# Patient Record
Sex: FEMALE | Race: WHITE
[De-identification: names, ages, dates, MRNs, and addresses within clinical notes are randomized per-mention and may not be internally consistent; named-entity substitution may affect disease eponyms.]

---

## 2017-02-27 ENCOUNTER — HOSPITAL ENCOUNTER (EMERGENCY)
Dept: HOSPITAL 72 - EMR | Age: 57
Discharge: HOME | End: 2017-02-27
Payer: COMMERCIAL

## 2017-02-27 VITALS — BODY MASS INDEX: 20.96 KG/M2 | HEIGHT: 59 IN | WEIGHT: 104 LBS

## 2017-02-27 VITALS — DIASTOLIC BLOOD PRESSURE: 78 MMHG | SYSTOLIC BLOOD PRESSURE: 132 MMHG

## 2017-02-27 VITALS — SYSTOLIC BLOOD PRESSURE: 132 MMHG | DIASTOLIC BLOOD PRESSURE: 78 MMHG

## 2017-02-27 VITALS — DIASTOLIC BLOOD PRESSURE: 82 MMHG | SYSTOLIC BLOOD PRESSURE: 126 MMHG

## 2017-02-27 DIAGNOSIS — R11.2: ICD-10-CM

## 2017-02-27 DIAGNOSIS — M25.552: ICD-10-CM

## 2017-02-27 DIAGNOSIS — Z91.81: ICD-10-CM

## 2017-02-27 DIAGNOSIS — R55: Primary | ICD-10-CM

## 2017-02-27 LAB
ALBUMIN/GLOB SERPL: 1.6 {RATIO} (ref 1–2.7)
ALT SERPL-CCNC: 14 U/L (ref 3–33)
ANION GAP SERPL CALC-SCNC: 13 MMOL/L (ref 5–15)
APTT BLD: 26 SEC (ref 23–33)
AST SERPL-CCNC: 20 U/L (ref 5–40)
BASOPHILS NFR BLD AUTO: 1.1 % (ref 0–2)
CALCIUM SERPL-MCNC: 9.3 MG/DL (ref 8.6–10.2)
CHLORIDE SERPL-SCNC: 102 MEQ/L (ref 98–107)
CO2 SERPL-SCNC: 25 MEQ/L (ref 20–30)
CREAT SERPL-MCNC: 0.9 MG/DL (ref 0.5–0.9)
EOSINOPHIL NFR BLD AUTO: 1.6 % (ref 0–3)
ERYTHROCYTE [DISTWIDTH] IN BLOOD BY AUTOMATED COUNT: 12.4 % (ref 11.6–14.8)
GFR SERPLBLD BASED ON 1.73 SQ M-ARVRAT: > 60 ML/MIN (ref 60–?)
GLOBULIN SER-MCNC: 2.5 G/DL
HEMOLYSIS: 3
INR PPP: 0.9 (ref 0.9–1.1)
LYMPHOCYTES NFR BLD AUTO: 26.7 % (ref 20–45)
MCH RBC QN AUTO: 31.1 PG (ref 27–31)
MCHC RBC AUTO-ENTMCNC: 34 G/DL (ref 32–36)
MCV RBC AUTO: 92 FL (ref 80–99)
MONOCYTES NFR BLD AUTO: 8.4 % (ref 1–10)
NEUTROPHILS NFR BLD AUTO: 62.2 % (ref 45–75)
PLATELET # BLD: 266 K/UL (ref 150–450)
PMV BLD AUTO: 7.3 FL (ref 6.5–10.1)
POTASSIUM SERPL-SCNC: 3.9 MEQ/L (ref 3.4–4.9)
PROT SERPL-MCNC: 6.5 G/DL (ref 6.6–8.7)
PROTHROMBIN TIME: 9.6 SEC (ref 9.3–11.5)
RBC # BLD AUTO: 3.97 M/UL (ref 4.2–5.4)
SODIUM SERPL-SCNC: 140 MEQ/L (ref 135–145)
WBC # BLD AUTO: 7.4 K/UL (ref 4.8–10.8)

## 2017-02-27 PROCEDURE — 73502 X-RAY EXAM HIP UNI 2-3 VIEWS: CPT

## 2017-02-27 PROCEDURE — 85025 COMPLETE CBC W/AUTO DIFF WBC: CPT

## 2017-02-27 PROCEDURE — 85379 FIBRIN DEGRADATION QUANT: CPT

## 2017-02-27 PROCEDURE — 85610 PROTHROMBIN TIME: CPT

## 2017-02-27 PROCEDURE — 85730 THROMBOPLASTIN TIME PARTIAL: CPT

## 2017-02-27 PROCEDURE — 80053 COMPREHEN METABOLIC PANEL: CPT

## 2017-02-27 PROCEDURE — 36415 COLL VENOUS BLD VENIPUNCTURE: CPT

## 2017-02-27 PROCEDURE — 93005 ELECTROCARDIOGRAM TRACING: CPT

## 2017-02-27 PROCEDURE — 99283 EMERGENCY DEPT VISIT LOW MDM: CPT

## 2017-02-27 NOTE — DIAGNOSTIC IMAGING REPORT
Indications: Left hip pain



Technique: Two views left hip



Findings:



Comparison: None.



No fracture, dislocation, lytic destruction, periosteal reaction, surrounding soft

tissue swelling, or other acute changes are demonstrated. No deformity, 

alignment

abnormality, arthritic change, soft tissue calcification, or other chronic changes

are demonstrated.



IMPRESSION:



Negative left hip series.



This correlates with [facility] preliminary report.

## 2017-02-27 NOTE — EMERGENCY ROOM REPORT
History of Present Illness


General


Chief Complaint:  Multiple Trauma/Fall


Source:  Patient





Present Illness


HPI


Patient is a 56-year-old female who presented after a syncopal episode.  

Patient recently been prescribed Chantix.  She reported feeling nauseated and 

subsequently vomiting.The patient reported feeling lightheaded prior to passing 

out.  She reports having subsequent pain to her left hip.  She had been able to 

anbulate with a walker after injury. She denied other locations of pain.


Allergies:  


Coded Allergies:  


     No Known Allergies (Unverified , 6/5/13)





Patient History


Past Medical History:  see triage record


Reviewed Nursing Documentation:  PMH: Agreed, PSxH: Agreed





Nursing Documentation-PMH


Past Medical History:  No Stated History





Review of Systems


All Other Systems:  negative except mentioned in HPI





Physical Exam





Vital Signs








  Date Time  Temp Pulse Resp B/P Pulse Ox O2 Delivery O2 Flow Rate FiO2


 


2/27/17 00:53 97.9 84 16 129/82 100 Room Air  








Sp02 EP Interpretation:  reviewed, normal


General Appearance:  normal inspection, well appearing, no apparent distress, 

alert, GCS 15


Head:  atraumatic


ENT:  normal ENT inspection, hearing grossly normal, normal voice


Neck:  normal inspection, full range of motion, supple, no bony tend


Respiratory:  normal inspection, lungs clear, normal breath sounds, no 

respiratory distress, no retraction, no wheezing


Cardiovascular #1:  regular rate, rhythm, no edema


Gastrointestinal:  normal inspection, normal bowel sounds, non tender, soft, no 

guarding, no hernia


Genitourinary:  no CVA tenderness


Musculoskeletal:  normal inspection, back normal, normal range of motion


Neurologic:  normal inspection, alert, oriented x3, responsive, CNs III-XII nml 

as tested, speech normal


Psychiatric:  normal inspection, judgement/insight normal, mood/affect normal


Skin:  normal inspection, normal color, no rash





Medical Decision Making


Diagnostic Impression:  


 Primary Impression:  


 Fall


 Additional Impressions:  


 Syncope


 Vasovagal episode


ER Course


The patient presented for syncope.lDifferential diagnosis included but not 

limited to syncope versus seizure.  Potential causes for syncope included 

arrhythmia, dehydration, acute coronary syndrome, severe anemia, pulmonary 

embolus.


Because of complexity of patient's case laboratory testing and imaging studies 

were ordered.


The x-ray of the left hip read by radiologist showed normal


Without any fracture.  EKG and rhythm me showed normal sinus rhythm with a rate 

in the 70s without PVCs or ectopy.  The patient was noted to have some pain to 

the medial thigh which is consistent with a muscle strain.  The patient is 

advised to follow up with  primary care doctor in 1-2 days.  Patient is advised 

to return if any worsening condition or if any changes in status that are 

concerning.





Laboratory Tests








Test


  2/27/17


01:14


 


White Blood Count


  7.4 K/UL


(4.8-10.8)


 


Red Blood Count


  3.97 M/UL


(4.20-5.40)  L


 


Hemoglobin


  12.4 G/DL


(12.0-16.0)


 


Hematocrit


  36.4 %


(37.0-47.0)  L


 


Mean Corpuscular Volume 92 FL (80-99)  


 


Mean Corpuscular Hemoglobin


  31.1 PG


(27.0-31.0)  H


 


Mean Corpuscular Hemoglobin


Concent 34.0 G/DL


(32.0-36.0)


 


Red Cell Distribution Width


  12.4 %


(11.6-14.8)


 


Platelet Count


  266 K/UL


(150-450)


 


Mean Platelet Volume


  7.3 FL


(6.5-10.1)


 


Neutrophils (%) (Auto)


  62.2 %


(45.0-75.0)


 


Lymphocytes (%) (Auto)


  26.7 %


(20.0-45.0)


 


Monocytes (%) (Auto)


  8.4 %


(1.0-10.0)


 


Eosinophils (%) (Auto)


  1.6 %


(0.0-3.0)


 


Basophils (%) (Auto)


  1.1 %


(0.0-2.0)


 


Prothrombin Time Pending  


 


Prothrombin Time INR Pending  


 


PTT Pending  


 


D-Dimer


  889 ng/mL


(<500)  H


 


Sodium Level


  140 mEQ/L


(135-145)


 


Potassium Level


  3.9 mEQ/L


(3.4-4.9)


 


Chloride Level


  102 mEQ/L


()


 


Carbon Dioxide Level


  25 mEQ/L


(20-30)


 


Anion Gap 13 (5-15)  


 


Blood Urea Nitrogen


  19 mg/dL


(7-23)


 


Creatinine


  0.9 mg/dL


(0.5-0.9)


 


Estimate Glomerular


Filtration Rate > 60 mL/min


(>60)


 


Glucose Level


  108 mg/dL


()  H


 


Calcium Level


  9.3 mg/dL


(8.6-10.2)


 


Total Bilirubin


  < 0.2 mg/dL


(0.0-1.2)


 


Aspartate Amino Transferase


(AST) 20 U/L (5-40)  


 


 


Alanine Aminotransferase (ALT) 14 U/L (3-33)  


 


Alkaline Phosphatase


  54 U/L


()


 


Total Protein


  6.5 g/dL


(6.6-8.7)  L


 


Albumin


  4.0 g/dL


(3.5-5.2)


 


Globulin 2.5 g/dL  


 


Albumin/Globulin Ratio 1.6 (1.0-2.7)  











Last Vital Signs








  Date Time  Temp Pulse Resp B/P Pulse Ox O2 Delivery O2 Flow Rate FiO2


 


2/27/17 01:18 97.9 84 16 126/82 100 Room Air  








Status:  improved


Disposition:  HOME, SELF-CARE


Condition:  Stable


Referrals:  


NON PHYSICIAN (PCP)











Mike Ruth Feb 27, 2017 01:25

## 2017-02-28 NOTE — CARDIOLOGY REPORT
--------------- APPROVED REPORT --------------





EKG Measurement

Heart [id number]

MT [id number]

[id number]

[id number]

[id number]





Normal sinus rhythm

Normal ECG

## 2017-08-01 ENCOUNTER — HOSPITAL ENCOUNTER (EMERGENCY)
Dept: HOSPITAL 72 - EMR | Age: 57
Discharge: HOME | End: 2017-08-01
Payer: COMMERCIAL

## 2017-08-01 VITALS — HEIGHT: 59 IN | WEIGHT: 97 LBS | BODY MASS INDEX: 19.56 KG/M2

## 2017-08-01 VITALS — DIASTOLIC BLOOD PRESSURE: 84 MMHG | SYSTOLIC BLOOD PRESSURE: 129 MMHG

## 2017-08-01 VITALS — SYSTOLIC BLOOD PRESSURE: 129 MMHG | DIASTOLIC BLOOD PRESSURE: 84 MMHG

## 2017-08-01 DIAGNOSIS — D25.1: ICD-10-CM

## 2017-08-01 DIAGNOSIS — D25.2: ICD-10-CM

## 2017-08-01 DIAGNOSIS — R10.2: Primary | ICD-10-CM

## 2017-08-01 DIAGNOSIS — N39.0: ICD-10-CM

## 2017-08-01 LAB
ALBUMIN/GLOB SERPL: 1.5 {RATIO} (ref 1–2.7)
ALT SERPL-CCNC: 11 U/L (ref 3–33)
ANION GAP SERPL CALC-SCNC: 12 MMOL/L (ref 5–15)
APPEARANCE UR: (no result)
AST SERPL-CCNC: 22 U/L (ref 5–40)
BACTERIA #/AREA URNS HPF: (no result) /HPF
BASOPHILS NFR BLD AUTO: 2 % (ref 0–2)
CALCIUM SERPL-MCNC: 9.7 MG/DL (ref 8.6–10.2)
CHLORIDE SERPL-SCNC: 105 MEQ/L (ref 98–107)
CO2 SERPL-SCNC: 28 MEQ/L (ref 20–30)
CREAT SERPL-MCNC: 1 MG/DL (ref 0.5–0.9)
EOSINOPHIL NFR BLD AUTO: 0.5 % (ref 0–3)
ERYTHROCYTE [DISTWIDTH] IN BLOOD BY AUTOMATED COUNT: 12.4 % (ref 11.6–14.8)
GFR SERPLBLD BASED ON 1.73 SQ M-ARVRAT: 56.9 ML/MIN (ref 60–?)
GLOBULIN SER-MCNC: 2.7 G/DL
HEMOLYSIS: 6
KETONES UR QL STRIP: (no result)
LEUKOCYTE ESTERASE UR QL STRIP: (no result)
LIPASE SERPL-CCNC: 70 U/L (ref ?–60)
LYMPHOCYTES NFR BLD AUTO: 35.2 % (ref 20–45)
MCH RBC QN AUTO: 33 PG (ref 27–31)
MCHC RBC AUTO-ENTMCNC: 34.7 G/DL (ref 32–36)
MCV RBC AUTO: 95 FL (ref 80–99)
MONOCYTES NFR BLD AUTO: 7.6 % (ref 1–10)
NEUTROPHILS NFR BLD AUTO: 54.8 % (ref 45–75)
NITRITE UR QL STRIP: NEGATIVE
PH UR STRIP: 6 [PH] (ref 4.5–8)
PLATELET # BLD: 307 K/UL (ref 150–450)
PMV BLD AUTO: 6.7 FL (ref 6.5–10.1)
POTASSIUM SERPL-SCNC: 4.1 MEQ/L (ref 3.4–4.9)
PROT SERPL-MCNC: 7 G/DL (ref 6.6–8.7)
PROT UR QL STRIP: NEGATIVE
RBC # BLD AUTO: 4.37 M/UL (ref 4.2–5.4)
RBC #/AREA URNS HPF: (no result) /HPF (ref 0–2)
SODIUM SERPL-SCNC: 145 MEQ/L (ref 135–145)
SP GR UR STRIP: 1.02 (ref 1–1.03)
SQUAMOUS #/AREA URNS LPF: (no result) /LPF
UROBILINOGEN UR-MCNC: 1 MG/DL (ref 0–1)
WBC # BLD AUTO: 6.9 K/UL (ref 4.8–10.8)
WBC #/AREA URNS HPF: (no result) /HPF (ref 0–2)
YEAST #/AREA URNS HPF: (no result) /HPF

## 2017-08-01 PROCEDURE — 80053 COMPREHEN METABOLIC PANEL: CPT

## 2017-08-01 PROCEDURE — 85025 COMPLETE CBC W/AUTO DIFF WBC: CPT

## 2017-08-01 PROCEDURE — 96374 THER/PROPH/DIAG INJ IV PUSH: CPT

## 2017-08-01 PROCEDURE — 36415 COLL VENOUS BLD VENIPUNCTURE: CPT

## 2017-08-01 PROCEDURE — 99284 EMERGENCY DEPT VISIT MOD MDM: CPT

## 2017-08-01 PROCEDURE — 83690 ASSAY OF LIPASE: CPT

## 2017-08-01 PROCEDURE — 76856 US EXAM PELVIC COMPLETE: CPT

## 2017-08-01 PROCEDURE — 81003 URINALYSIS AUTO W/O SCOPE: CPT

## 2017-08-01 PROCEDURE — 87086 URINE CULTURE/COLONY COUNT: CPT

## 2017-08-01 PROCEDURE — 87181 SC STD AGAR DILUTION PER AGT: CPT

## 2017-08-01 NOTE — EMERGENCY ROOM REPORT
History of Present Illness


General


Chief Complaint:  Female Urogenital Problems


Source:  Patient





Present Illness


HPI


Patient presents emergency department today complaining of pelvic pain.  

Patient states that she was recently treated for a yeast infection but symptoms 

have not improved instead she's had worsening pelvic pain.  She denies any 

fever nausea vomiting diarrhea chills.  No other complaints are noted.  

Symptoms noted to be moderate to severe.No other modifying factors.  No other 

associated signs and symptoms.  No other complaints were noted.


Allergies:  


Coded Allergies:  


     No Known Allergies (Unverified , 6/5/13)





Patient History


Past Medical History:  none


Past Surgical History:  none


Pertinent Family History:  none


Social History:  Denies: alcohol use, drug use, smoking


Reviewed Nursing Documentation:  PMH: Agreed, PSxH: Agreed





Nursing Documentation-PMH


Hx Gastrointestinal Problems:  Yes - Gall stones





Review of Systems


All Other Systems:  negative except mentioned in HPI





Physical Exam





Vital Signs








  Date Time  Temp Pulse Resp B/P Pulse Ox O2 Delivery O2 Flow Rate FiO2


 


8/1/17 17:15 99.1 77 16 132/87 99 Room Air  








Sp02 EP Interpretation:  reviewed, normal


General Appearance:  normal inspection, well appearing, no apparent distress, 

alert


Head:  atraumatic


Eyes:  bilateral eye normal inspection


ENT:  normal ENT inspection, hearing grossly normal, normal voice


Neck:  normal inspection, full range of motion, supple, no bony tend


Respiratory:  normal inspection, lungs clear, normal breath sounds, no 

respiratory distress, no retraction, no wheezing


Cardiovascular #1:  regular rate, rhythm, no edema


Gastrointestinal:  normal inspection, normal bowel sounds, soft, no guarding, 

no hernia, tenderness - suprapubic


Genitourinary:  no CVA tenderness


Musculoskeletal:  normal inspection, back normal, normal range of motion


Neurologic:  normal inspection, alert, responsive, speech normal


Psychiatric:  normal inspection, judgement/insight normal, mood/affect normal


Skin:  normal inspection, normal color, no rash





Medical Decision Making


Diagnostic Impression:  


 Primary Impression:  


 Pelvic pain


 Additional Impression:  


 UTI (urinary tract infection)


ER Course


Patient presents emergency department today complaining of pelvic pain.  

Differential diagnoses include UTI, ovarian torsion, use infection, colitis 

just to name a few.Given the severity of the patient's presentation I felt this 

is a highly complex patient.  This patient required extensive workup.  Patient'

s laboratory workup showed an elevated WBC consistent with UTI.  Patient's 

pelvic ultrasound was negative.  Given patient's presentation I felt the 

patient would benefit from antibiotics.  Patient was given prescription for 

antibiotics recommend close followup.Patient is advised to follow up with 

primary doctor in 2-3 days and return the emergency room for any worsening 

symptoms and as needed.





Labs








Test


  8/1/17


17:23 8/1/17


19:14


 


Urine Color Yellow  


 


Urine Appearance


  Slightly


cloudy 


 


 


Urine pH 6 (4.5-8.0)  


 


Urine Specific Gravity


  1.020


(1.005-1.035) 


 


 


Urine Protein


  Negative


(NEGATIVE) 


 


 


Urine Glucose (UA)


  Negative


(NEGATIVE) 


 


 


Urine Ketones 1+ (NEGATIVE)  


 


Urine Occult Blood 1+ (NEGATIVE)  


 


Urine Nitrite


  Negative


(NEGATIVE) 


 


 


Urine Bilirubin


  Negative


(NEGATIVE) 


 


 


Urine Urobilinogen


  1 MG/DL


(0.0-1.0) 


 


 


Urine Leukocyte Esterase 3+ (NEGATIVE)  


 


Urine RBC


  2-4 /HPF (0 -


2) 


 


 


Urine WBC


  5-10 /HPF (0 -


2) 


 


 


Urine Squamous Epithelial


Cells Few /LPF


(NONE/OCC) 


 


 


Urine Bacteria


  Moderate /HPF


(NONE) 


 


 


Urine Yeast


  Few /HPF


(NONE) 


 


 


White Blood Count


  


  6.9 K/UL


(4.8-10.8)


 


Red Blood Count


  


  4.37 M/UL


(4.20-5.40)


 


Hemoglobin


  


  14.4 G/DL


(12.0-16.0)


 


Hematocrit


  


  41.5 %


(37.0-47.0)


 


Mean Corpuscular Volume  95 FL (80-99) 


 


Mean Corpuscular Hemoglobin


  


  33.0 PG


(27.0-31.0)


 


Mean Corpuscular Hemoglobin


Concent 


  34.7 G/DL


(32.0-36.0)


 


Red Cell Distribution Width


  


  12.4 %


(11.6-14.8)


 


Platelet Count


  


  307 K/UL


(150-450)


 


Mean Platelet Volume


  


  6.7 FL


(6.5-10.1)


 


Neutrophils (%) (Auto)


  


  54.8 %


(45.0-75.0)


 


Lymphocytes (%) (Auto)


  


  35.2 %


(20.0-45.0)


 


Monocytes (%) (Auto)


  


  7.6 %


(1.0-10.0)


 


Eosinophils (%) (Auto)


  


  0.5 %


(0.0-3.0)


 


Basophils (%) (Auto)


  


  2.0 %


(0.0-2.0)


 


Sodium Level


  


  145 mEQ/L


(135-145)


 


Potassium Level


  


  4.1 mEQ/L


(3.4-4.9)


 


Chloride Level


  


  105 mEQ/L


()


 


Carbon Dioxide Level


  


  28 mEQ/L


(20-30)


 


Anion Gap  12 (5-15) 


 


Blood Urea Nitrogen


  


  16 mg/dL


(7-23)


 


Creatinine


  


  1.0 mg/dL


(0.5-0.9)


 


Estimat Glomerular Filtration


Rate 


  56.9 mL/min


(>60)


 


Glucose Level


  


  112 mg/dL


()


 


Calcium Level


  


  9.7 mg/dL


(8.6-10.2)


 


Total Bilirubin


  


  < 0.2 mg/dL


(0.0-1.2)


 


Aspartate Amino Transf


(AST/SGOT) 


  22 U/L (5-40) 


 


 


Alanine Aminotransferase


(ALT/SGPT) 


  11 U/L (3-33) 


 


 


Alkaline Phosphatase


  


  72 U/L


()


 


Total Protein


  


  7.0 g/dL


(6.6-8.7)


 


Albumin


  


  4.3 g/dL


(3.5-5.2)


 


Globulin  2.7 g/dL 


 


Albumin/Globulin Ratio  1.5 (1.0-2.7) 


 


Lipase  70 U/L (< 60) 











Last Vital Signs








  Date Time  Temp Pulse Resp B/P Pulse Ox O2 Delivery O2 Flow Rate FiO2


 


8/1/17 19:16 98.9 79 15 129/84 99 Room Air  








Status:  improved


Disposition:  HOME, SELF-CARE


Condition:  Stable


Scripts


Fluconazole (FLUCONAZOLE) 100 Mg Tablet


100 MG ORAL DAILY, #7 TAB 0 Refills


   Prov: PRASHANT SEALS M.D.         8/1/17 


Cephalexin* (KEFLEX*) 500 Mg Capsule


500 MG ORAL Q6H, #28 CAP 0 Refills


   Prov: PRASHANT SEALS M.D.         8/1/17


Patient Instructions:  Urinary Tract Infection











PRASHANT SEALS M.D. Aug 1, 2017 20:54

## 2017-08-02 NOTE — DIAGNOSTIC IMAGING REPORT
Indication: PAIN lower abdominal pain, postmenopausal, possible urinary tract

infection



Technique: Transabdominal and transvaginal images



Comparison: None



Findings: Uterus measures 6 cm length by 2 cm AP. Demonstrates a questionable 

upper

fundal exophytic fibroid, as well as multiple intramural and subserosal 

fibroids.

Myometrium demonstrates somewhat increased vascularity. The endometrium measures 2

mm thick. A small calcification is seen in the cervix, nonspecific. Left ovary

measures 8 mm in length. Right ovary could not be visualized. No free cul-de-sac

fluid.



Impression: Fibroid uterus



Nonspecific increased final vascularity, significance uncertain



No definite adnexal mass. Note, however, inability to visualize the right ovary

## 2019-09-11 ENCOUNTER — HOSPITAL ENCOUNTER (EMERGENCY)
Dept: HOSPITAL 72 - EMR | Age: 59
Discharge: HOME | End: 2019-09-11
Payer: COMMERCIAL

## 2019-09-11 VITALS — HEIGHT: 59 IN | WEIGHT: 102 LBS | BODY MASS INDEX: 20.56 KG/M2

## 2019-09-11 VITALS — DIASTOLIC BLOOD PRESSURE: 72 MMHG | SYSTOLIC BLOOD PRESSURE: 125 MMHG

## 2019-09-11 VITALS — SYSTOLIC BLOOD PRESSURE: 120 MMHG | DIASTOLIC BLOOD PRESSURE: 71 MMHG

## 2019-09-11 VITALS — SYSTOLIC BLOOD PRESSURE: 118 MMHG | DIASTOLIC BLOOD PRESSURE: 72 MMHG

## 2019-09-11 DIAGNOSIS — R10.13: Primary | ICD-10-CM

## 2019-09-11 DIAGNOSIS — M54.6: ICD-10-CM

## 2019-09-11 LAB
ADD MANUAL DIFF: NO
ALBUMIN SERPL-MCNC: 3.6 G/DL (ref 3.4–5)
ALBUMIN/GLOB SERPL: 1 {RATIO} (ref 1–2.7)
ALP SERPL-CCNC: 76 U/L (ref 46–116)
ALT SERPL-CCNC: 21 U/L (ref 12–78)
ANION GAP SERPL CALC-SCNC: 10 MMOL/L (ref 5–15)
APPEARANCE UR: CLEAR
APTT BLD: 26 SEC (ref 23–33)
APTT PPP: (no result) S
AST SERPL-CCNC: 24 U/L (ref 15–37)
BASOPHILS NFR BLD AUTO: 1.5 % (ref 0–2)
BILIRUB SERPL-MCNC: 0.2 MG/DL (ref 0.2–1)
BUN SERPL-MCNC: 16 MG/DL (ref 7–18)
CALCIUM SERPL-MCNC: 8.9 MG/DL (ref 8.5–10.1)
CHLORIDE SERPL-SCNC: 105 MMOL/L (ref 98–107)
CO2 SERPL-SCNC: 25 MMOL/L (ref 21–32)
CREAT SERPL-MCNC: 1.1 MG/DL (ref 0.55–1.3)
EOSINOPHIL NFR BLD AUTO: 0.9 % (ref 0–3)
ERYTHROCYTE [DISTWIDTH] IN BLOOD BY AUTOMATED COUNT: 11.3 % (ref 11.6–14.8)
GLOBULIN SER-MCNC: 3.5 G/DL
GLUCOSE UR STRIP-MCNC: NEGATIVE MG/DL
HCT VFR BLD CALC: 35.1 % (ref 37–47)
HGB BLD-MCNC: 12.5 G/DL (ref 12–16)
INR PPP: 1 (ref 0.9–1.1)
KETONES UR QL STRIP: NEGATIVE
LEUKOCYTE ESTERASE UR QL STRIP: (no result)
LYMPHOCYTES NFR BLD AUTO: 29.4 % (ref 20–45)
MCV RBC AUTO: 87 FL (ref 80–99)
MONOCYTES NFR BLD AUTO: 8.2 % (ref 1–10)
NEUTROPHILS NFR BLD AUTO: 60 % (ref 45–75)
NITRITE UR QL STRIP: NEGATIVE
PH UR STRIP: 7 [PH] (ref 4.5–8)
PLATELET # BLD: 335 K/UL (ref 150–450)
POTASSIUM SERPL-SCNC: 3.9 MMOL/L (ref 3.5–5.1)
PROT UR QL STRIP: NEGATIVE
RBC # BLD AUTO: 4.05 M/UL (ref 4.2–5.4)
SODIUM SERPL-SCNC: 140 MMOL/L (ref 136–145)
SP GR UR STRIP: 1 (ref 1–1.03)
UROBILINOGEN UR-MCNC: NORMAL MG/DL (ref 0–1)
WBC # BLD AUTO: 7.4 K/UL (ref 4.8–10.8)

## 2019-09-11 PROCEDURE — 93005 ELECTROCARDIOGRAM TRACING: CPT

## 2019-09-11 PROCEDURE — 96374 THER/PROPH/DIAG INJ IV PUSH: CPT

## 2019-09-11 PROCEDURE — 85610 PROTHROMBIN TIME: CPT

## 2019-09-11 PROCEDURE — 74018 RADEX ABDOMEN 1 VIEW: CPT

## 2019-09-11 PROCEDURE — 85025 COMPLETE CBC W/AUTO DIFF WBC: CPT

## 2019-09-11 PROCEDURE — 99284 EMERGENCY DEPT VISIT MOD MDM: CPT

## 2019-09-11 PROCEDURE — 85730 THROMBOPLASTIN TIME PARTIAL: CPT

## 2019-09-11 PROCEDURE — 36415 COLL VENOUS BLD VENIPUNCTURE: CPT

## 2019-09-11 PROCEDURE — 84484 ASSAY OF TROPONIN QUANT: CPT

## 2019-09-11 PROCEDURE — 83690 ASSAY OF LIPASE: CPT

## 2019-09-11 PROCEDURE — 71045 X-RAY EXAM CHEST 1 VIEW: CPT

## 2019-09-11 PROCEDURE — 81003 URINALYSIS AUTO W/O SCOPE: CPT

## 2019-09-11 PROCEDURE — 80053 COMPREHEN METABOLIC PANEL: CPT

## 2019-09-11 NOTE — NUR
ED Nurse Note:



Received report from Kylie CHERRY. Patient alert and oriented x4, verbally 
responsive. C/o 6/10 pain on right upper abdominal. With IV line on Right Ac 
20g patent and intact. Breathing even nad unlabored.

## 2019-09-11 NOTE — EMERGENCY ROOM REPORT
History of Present Illness


General


Chief Complaint:  Abdominal Pain


Source:  Patient





Present Illness


HPI


Patient presents with 2 days of epigastric pain this radiates towards her back.

  She is been taking Nexium for 2 months.  7 years ago she had a history of H. 

pylori.  The pain is mild if she does not press on that area.  Today she got 

concerned because when she tried to swallow food it had difficulty going down 

into her stomach.  She felt somewhat short of breath at that time.  She denies 

chest pain.  There is no vomiting or diarrhea.  No melena.  No fevers or 

chills.  She feels bloated.  She felt anxious.





No sore throat, palpitations, dysuria, joint pain, rashes, visual changes, 

headache.


Allergies:  


Coded Allergies:  


     No Known Allergies (Unverified , 6/5/13)





Patient History


Past Medical History:  see triage record


Social History:  Denies: smoking, alcohol use, drug use


Social History Narrative


 with daughter


Last Menstrual Period:  n/a


Reviewed Nursing Documentation:  PMH: Agreed; PSxH: Agreed





Nursing Documentation-PMH


Past Medical History:  No History, Except For


Hx Gastrointestinal Problems:  Yes - Gall stones





Review of Systems


All Other Systems:  negative except mentioned in HPI





Physical Exam





Vital Signs








  Date Time  Temp Pulse Resp B/P (MAP) Pulse Ox O2 Delivery O2 Flow Rate FiO2


 


9/11/19 16:47 98.4 81 18 113/80 (91) 98 Room Air  








Sp02 EP Interpretation:  reviewed, normal


General Appearance:  well appearing, no apparent distress, GCS 15


Head:  normocephalic


Eyes:  bilateral eye normal inspection, bilateral eye PERRL


ENT:  moist mucus membranes


Neck:  supple


Respiratory:  lungs clear, normal breath sounds


Cardiovascular #1:  regular rate, rhythm, no edema


Cardiovascular #2:  2+ radial (R), 2+ femoral (R), 2+ femoral (L)


Gastrointestinal:  normal inspection, normal bowel sounds, no mass, no bruit, 

non-distended, tenderness - Minimal and pressing epigastric area and xiphoid.  

No right upper quadrant tenderness


Genitourinary:  no CVA tenderness


Musculoskeletal:  back normal - Though reported mid back tenderness, gait/

station normal, normal range of motion, no calf tenderness, Jagdeep's Sign 

negative


Neurologic:  alert, oriented x3, grossly normal


Psychiatric:  anxious


Skin:  no rash





Medical Decision Making


Diagnostic Impression:  


 Primary Impression:  


 Abdominal pain


 Qualified Codes:  R10.13 - Epigastric pain


 Additional Impression:  


 Back pain


 Qualified Codes:  M54.6 - Pain in thoracic spine


ER Course


Patient presents with epigastric pain rating towards her back.  Differential 

includes pancreatitis, cholecystitis, cholelithiasis, GERD, gastritis, chest 

wall pain, aortic aneurysm, back pain amongst others.  Patient will be 

evaluated with EKG, chest x-ray abdomen film and labs.  This is not a surgical 

belly.  Based on the abdominal exam and history aneurysm is excluded.  Patient 

will be treated with some mild IV hydration and also given a dose of Pepcid.  

CT of the abdomen is not indicated unless leukocytosis or other lab 

abnormalities.





EKG with normal sinus rhythm rate of 75 and normal.  Chest x-ray unremarkable.  

Abdominal film as below no obstruction or free air.  Labs basically 

unremarkable.





Patient still with discomfort.  She refuses stronger analgesia at this time.  

She is concerned about the bloating and the pain.  I discussed possible 

etiologies in the she would need to follow-up with her own doctor.  She refused 

a prescription for tramadol but agreed for Mylanta.





No medical emergency at this time.  Patient stable for outpatient observation 

and treatment.  She was advised to return if she is worsening.





Laboratory Tests








Test


  9/11/19


17:22


 


White Blood Count


  7.4 K/UL


(4.8-10.8)


 


Red Blood Count


  4.05 M/UL


(4.20-5.40)  L


 


Hemoglobin


  12.5 G/DL


(12.0-16.0)


 


Hematocrit


  35.1 %


(37.0-47.0)  L


 


Mean Corpuscular Volume 87 FL (80-99)  


 


Mean Corpuscular Hemoglobin


  30.8 PG


(27.0-31.0)


 


Mean Corpuscular Hemoglobin


Concent 35.5 G/DL


(32.0-36.0)


 


Red Cell Distribution Width


  11.3 %


(11.6-14.8)  L


 


Platelet Count


  335 K/UL


(150-450)


 


Mean Platelet Volume


  5.6 FL


(6.5-10.1)  L


 


Neutrophils (%) (Auto)


  60.0 %


(45.0-75.0)


 


Lymphocytes (%) (Auto)


  29.4 %


(20.0-45.0)


 


Monocytes (%) (Auto)


  8.2 %


(1.0-10.0)


 


Eosinophils (%) (Auto)


  0.9 %


(0.0-3.0)


 


Basophils (%) (Auto)


  1.5 %


(0.0-2.0)


 


Prothrombin Time


  10.2 SEC


(9.30-11.50)


 


Prothrombin Time INR 1.0 (0.9-1.1)  


 


PTT


  26 SEC (23-33)


 


 


Urine Color Pale yellow  


 


Urine Appearance Clear  


 


Urine pH 7 (4.5-8.0)  


 


Urine Specific Gravity


  1.005


(1.005-1.035)


 


Urine Protein


  Negative


(NEGATIVE)


 


Urine Glucose (UA)


  Negative


(NEGATIVE)


 


Urine Ketones


  Negative


(NEGATIVE)


 


Urine Blood


  Negative


(NEGATIVE)


 


Urine Nitrite


  Negative


(NEGATIVE)


 


Urine Bilirubin


  Negative


(NEGATIVE)


 


Urine Urobilinogen


  Normal MG/DL


(0.0-1.0)


 


Urine Leukocyte Esterase


  2+ (NEGATIVE)


H


 


Urine RBC


  0-2 /HPF (0 -


2)


 


Urine WBC


  2-4 /HPF (0 -


2)


 


Urine Squamous Epithelial


Cells Few /LPF


(NONE/OCC)


 


Urine Bacteria


  Few /HPF


(NONE)


 


Sodium Level


  140 MMOL/L


(136-145)


 


Potassium Level


  3.9 MMOL/L


(3.5-5.1)


 


Chloride Level


  105 MMOL/L


()


 


Carbon Dioxide Level


  25 MMOL/L


(21-32)


 


Anion Gap


  10 mmol/L


(5-15)


 


Blood Urea Nitrogen


  16 mg/dL


(7-18)


 


Creatinine


  1.1 MG/DL


(0.55-1.30)


 


Estimate Glomerular


Filtration Rate 50.7 mL/min


(>60)


 


Glucose Level


  95 MG/DL


()


 


Calcium Level


  8.9 MG/DL


(8.5-10.1)


 


Total Bilirubin


  0.2 MG/DL


(0.2-1.0)


 


Aspartate Amino Transferase


(AST) 24 U/L (15-37)


 


 


Alanine Aminotransferase (ALT)


  21 U/L (12-78)


 


 


Alkaline Phosphatase


  76 U/L


()


 


Troponin I


  0.000 ng/mL


(0.000-0.056)


 


Total Protein


  7.1 G/DL


(6.4-8.2)


 


Albumin


  3.6 G/DL


(3.4-5.0)


 


Globulin 3.5 g/dL  


 


Albumin/Globulin Ratio 1.0 (1.0-2.7)  


 


Lipase


  309 U/L


()








EKG Diagnostic Results


Rate:  normal


Rhythm:  NSR


ST Segments:  no acute changes





Rhythm Strip Diag. Results


EP Interpretation:  yes


Rhythm:  NSR, no PVC's, no ectopy





Chest X-Ray Diagnostic Results


Chest X-Ray Diagnostic Results :  


   Chest X-Ray Ordered:  Yes


   # of Views/Limited/Complete:  1 View


   Indication:  Other


   EP Interpretation:  Yes


   Interpretation:  no consolidation, no effusion, no pneumothorax


   Impression:  No acute disease


   Electronically Signed by:  Electronically signed by Preet Stafford MD





Other X-Ray Diagnostic Results


Other X-Ray Diagnostic Results :  


   X-Ray ordered:  Abdomen


   # of Views/Limited Vs Complete:  1 View


   Indication:  Pain


   EP Interpretation:  Yes


   Interpretation:  nonspecific bowel gas, no sbo, other - zipper


   Impression:  No acute disease


   Electronically Signed by:  Electronically signed by Preet Stafford MD





Last Vital Signs








  Date Time  Temp Pulse Resp B/P (MAP) Pulse Ox O2 Delivery O2 Flow Rate FiO2


 


9/11/19 19:52 97.8 80 18 125/72 98 Room Air  








Status:  improved


Disposition:  HOME, SELF-CARE


Condition:  Improved


Scripts


Mag Hydrox/Al Hydrox/Simeth (MAALOX MAXIMUM STRENGTH SUSP) 355 Ml Oral.susp


30 ML PO Q6HR, #240 ML


   Prov: Preet Stafford MD         9/11/19 


Acetaminophen (Tylenol) 325 Mg Tablet


650 MG ORAL Q6H PRN for Prn Pain/Headache/Temp > 101, #20 TAB 0 Refills


   Prov: Preet Stafford MD         9/11/19











Preet Stafford MD Sep 11, 2019 17:13

## 2019-09-11 NOTE — NUR
ED Nurse Note:

Patient walked into ED c/o right upper abdominal pain radiating to her back 
since yesterday. patient denies vomiting or diarrhea. patient reports she feels 
bloated. 

patient is alert awake x4 ambulatory steady gait. breathing unlabored and even.

## 2019-09-12 ENCOUNTER — HOSPITAL ENCOUNTER (EMERGENCY)
Dept: HOSPITAL 72 - EMR | Age: 59
Discharge: HOME | End: 2019-09-12
Payer: COMMERCIAL

## 2019-09-12 VITALS — HEIGHT: 59 IN | WEIGHT: 102 LBS | BODY MASS INDEX: 20.56 KG/M2

## 2019-09-12 VITALS — SYSTOLIC BLOOD PRESSURE: 116 MMHG | DIASTOLIC BLOOD PRESSURE: 72 MMHG

## 2019-09-12 VITALS — SYSTOLIC BLOOD PRESSURE: 112 MMHG | DIASTOLIC BLOOD PRESSURE: 74 MMHG

## 2019-09-12 DIAGNOSIS — K80.20: ICD-10-CM

## 2019-09-12 DIAGNOSIS — K20.9: ICD-10-CM

## 2019-09-12 DIAGNOSIS — K29.70: Primary | ICD-10-CM

## 2019-09-12 LAB
ADD MANUAL DIFF: NO
ALBUMIN SERPL-MCNC: 3.9 G/DL (ref 3.4–5)
ALBUMIN/GLOB SERPL: 1.1 {RATIO} (ref 1–2.7)
ALP SERPL-CCNC: 80 U/L (ref 46–116)
ALT SERPL-CCNC: 20 U/L (ref 12–78)
ANION GAP SERPL CALC-SCNC: 7 MMOL/L (ref 5–15)
APPEARANCE UR: CLEAR
APTT PPP: (no result) S
AST SERPL-CCNC: 26 U/L (ref 15–37)
BASOPHILS NFR BLD AUTO: 1.4 % (ref 0–2)
BILIRUB SERPL-MCNC: 0.2 MG/DL (ref 0.2–1)
BUN SERPL-MCNC: 14 MG/DL (ref 7–18)
CALCIUM SERPL-MCNC: 9.3 MG/DL (ref 8.5–10.1)
CHLORIDE SERPL-SCNC: 106 MMOL/L (ref 98–107)
CO2 SERPL-SCNC: 29 MMOL/L (ref 21–32)
CREAT SERPL-MCNC: 0.9 MG/DL (ref 0.55–1.3)
EOSINOPHIL NFR BLD AUTO: 0.8 % (ref 0–3)
ERYTHROCYTE [DISTWIDTH] IN BLOOD BY AUTOMATED COUNT: 11.8 % (ref 11.6–14.8)
GLOBULIN SER-MCNC: 3.7 G/DL
GLUCOSE UR STRIP-MCNC: NEGATIVE MG/DL
HCT VFR BLD CALC: 40 % (ref 37–47)
HGB BLD-MCNC: 13.5 G/DL (ref 12–16)
KETONES UR QL STRIP: NEGATIVE
LEUKOCYTE ESTERASE UR QL STRIP: (no result)
LYMPHOCYTES NFR BLD AUTO: 28.6 % (ref 20–45)
MCV RBC AUTO: 92 FL (ref 80–99)
MONOCYTES NFR BLD AUTO: 6.7 % (ref 1–10)
NEUTROPHILS NFR BLD AUTO: 62.5 % (ref 45–75)
NITRITE UR QL STRIP: NEGATIVE
PH UR STRIP: 7 [PH] (ref 4.5–8)
PLATELET # BLD: 353 K/UL (ref 150–450)
POTASSIUM SERPL-SCNC: 4 MMOL/L (ref 3.5–5.1)
PROT UR QL STRIP: NEGATIVE
RBC # BLD AUTO: 4.36 M/UL (ref 4.2–5.4)
SODIUM SERPL-SCNC: 142 MMOL/L (ref 136–145)
SP GR UR STRIP: 1.01 (ref 1–1.03)
UROBILINOGEN UR-MCNC: NORMAL MG/DL (ref 0–1)
WBC # BLD AUTO: 6.5 K/UL (ref 4.8–10.8)

## 2019-09-12 PROCEDURE — 76700 US EXAM ABDOM COMPLETE: CPT

## 2019-09-12 PROCEDURE — 99284 EMERGENCY DEPT VISIT MOD MDM: CPT

## 2019-09-12 PROCEDURE — 80053 COMPREHEN METABOLIC PANEL: CPT

## 2019-09-12 PROCEDURE — 96361 HYDRATE IV INFUSION ADD-ON: CPT

## 2019-09-12 PROCEDURE — 85025 COMPLETE CBC W/AUTO DIFF WBC: CPT

## 2019-09-12 PROCEDURE — 74177 CT ABD & PELVIS W/CONTRAST: CPT

## 2019-09-12 PROCEDURE — 36415 COLL VENOUS BLD VENIPUNCTURE: CPT

## 2019-09-12 PROCEDURE — 96374 THER/PROPH/DIAG INJ IV PUSH: CPT

## 2019-09-12 PROCEDURE — 81003 URINALYSIS AUTO W/O SCOPE: CPT

## 2019-09-12 PROCEDURE — 83690 ASSAY OF LIPASE: CPT

## 2019-09-12 NOTE — EMERGENCY ROOM REPORT
History of Present Illness


General


Chief Complaint:  Pain


Source:  Patient





Present Illness


HPI


This patient states that for the past 4-5 days she has had pain in her 

epigastrium.  The pain is very localized under her xiphoid process.  She states 

she is also had several episodes of nausea and vomiting.  She is also had a sour

/metallic taste in the back of her throat.  She states that the area is tender 

to palpation.  She also has pain in her upper back.  She denies fever or 

chills.  She does have a history of gallstones.  She has not had her 

gallbladder removed.  She denies diarrhea or constipation.  She denies headache 

or neck pain.  She is accompanied by her friend who is also concerned because 

when she was having severe pain she "passed out."  She denies shortness of 

breath or chest pain.  She has no other complaints.


Allergies:  


Coded Allergies:  


     No Known Allergies (Unverified , 6/5/13)





Patient History


Past Medical History:  other - Gallstones


Social History:  Denies: smoking, alcohol use, drug use


Reviewed Nursing Documentation:  PMH: Agreed; PSxH: Agreed





Nursing Documentation-PM


Past Medical History:  No History, Except For


Hx Gastrointestinal Problems:  Yes - Gall stones





Review of Systems


All Other Systems:  negative except mentioned in HPI





Physical Exam





Vital Signs








  Date Time  Temp Pulse Resp B/P (MAP) Pulse Ox O2 Delivery O2 Flow Rate FiO2


 


9/12/19 14:08 98.4 74 20 109/77 (88) 99 Room Air  








Sp02 EP Interpretation:  reviewed, normal


General Appearance:  no apparent distress, alert, GCS 15, non-toxic


Head:  normocephalic, atraumatic


Eyes:  bilateral eye normal inspection, bilateral eye PERRL


ENT:  hearing grossly normal, normal pharynx, no angioedema, normal voice


Neck:  full range of motion, supple/symm/no masses


Respiratory:  chest non-tender, lungs clear, normal breath sounds, no 

respiratory distress, no retraction, no accessory muscle use, speaking full 

sentences


Cardiovascular #1:  regular rate, rhythm, no edema


Gastrointestinal:  normal bowel sounds, soft, non-distended, no guarding, no 

rebound, tenderness - TTP over the xiphoid process and a very localized area of 

her epigastrium.


Rectal:  deferred


Musculoskeletal:  back normal, gait/station normal, normal range of motion, non-

tender


Neurologic:  alert, oriented x3, responsive, motor strength/tone normal, 

sensory intact, speech normal


Psychiatric:  judgement/insight normal, memory normal, mood/affect normal, no 

suicidal/homicidal ideation


Skin:  no rash, normal color





Medical Decision Making


Diagnostic Impression:  


 Primary Impression:  


 Gastritis


 Additional Impressions:  


 Esophagitis


 Cholelithiasis


ER Course


This patient has a clinical presentation consistent with gastritis and 

esophagitis.  The location of the pain and history and physical examination is 

consistent with this.  I considered other concerning differentials, to include 

appendicitis, cholecystitis, pancreatitis, perforated viscus, aortic aneurysm, 

and pyelonephritis to name a few.  However, laboratory workup in combination 

with medical and surgical history and physical exam makes these unlikely at 

this time.  The patient does have cholelithiasis.  She states that she was 

diagnosed with this 8 years ago.  Right upper quadrant ultrasound does show 

gallstones but no evidence of cholecystitis.  I did offer the patient admission 

to the hospital for symptomatic cholelithiasis and possible cholecystectomy.  

However, the patient states she would rather treat for gastritis and 

esophagitis at this time.  She states that she will follow-up as instructed 

with a gastroenterologist for upper endoscopy.  She was educated to return to 

the emergency department for symptoms worsen for concern of worsening 

gallbladder disease it.  I did educate the patient on close return precautions 

and followup instructions.





Laboratory Tests








Test


  9/12/19


14:40


 


White Blood Count


  6.5 K/UL


(4.8-10.8)


 


Red Blood Count


  4.36 M/UL


(4.20-5.40)


 


Hemoglobin


  13.5 G/DL


(12.0-16.0)


 


Hematocrit


  40.0 %


(37.0-47.0)


 


Mean Corpuscular Volume 92 FL (80-99)  


 


Mean Corpuscular Hemoglobin


  30.9 PG


(27.0-31.0)


 


Mean Corpuscular Hemoglobin


Concent 33.7 G/DL


(32.0-36.0)


 


Red Cell Distribution Width


  11.8 %


(11.6-14.8)


 


Platelet Count


  353 K/UL


(150-450)


 


Mean Platelet Volume


  7.1 FL


(6.5-10.1)


 


Neutrophils (%) (Auto)


  62.5 %


(45.0-75.0)


 


Lymphocytes (%) (Auto)


  28.6 %


(20.0-45.0)


 


Monocytes (%) (Auto)


  6.7 %


(1.0-10.0)


 


Eosinophils (%) (Auto)


  0.8 %


(0.0-3.0)


 


Basophils (%) (Auto)


  1.4 %


(0.0-2.0)


 


Urine Color Pale yellow  


 


Urine Appearance Clear  


 


Urine pH 7 (4.5-8.0)  


 


Urine Specific Gravity


  1.010


(1.005-1.035)


 


Urine Protein


  Negative


(NEGATIVE)


 


Urine Glucose (UA)


  Negative


(NEGATIVE)


 


Urine Ketones


  Negative


(NEGATIVE)


 


Urine Blood


  Negative


(NEGATIVE)


 


Urine Nitrite


  Negative


(NEGATIVE)


 


Urine Bilirubin


  Negative


(NEGATIVE)


 


Urine Urobilinogen


  Normal MG/DL


(0.0-1.0)


 


Urine Leukocyte Esterase


  1+ (NEGATIVE)


H


 


Urine RBC


  0-2 /HPF (0 -


2)


 


Urine WBC


  2-4 /HPF (0 -


2)


 


Urine Squamous Epithelial


Cells Few /LPF


(NONE/OCC)


 


Urine Bacteria


  Few /HPF


(NONE)


 


Sodium Level


  142 MMOL/L


(136-145)


 


Potassium Level


  4.0 MMOL/L


(3.5-5.1)


 


Chloride Level


  106 MMOL/L


()


 


Carbon Dioxide Level


  29 MMOL/L


(21-32)


 


Anion Gap


  7 mmol/L


(5-15)


 


Blood Urea Nitrogen


  14 mg/dL


(7-18)


 


Creatinine


  0.9 MG/DL


(0.55-1.30)


 


Estimate Glomerular


Filtration Rate > 60 mL/min


(>60)


 


Glucose Level


  124 MG/DL


()  H


 


Calcium Level


  9.3 MG/DL


(8.5-10.1)


 


Total Bilirubin


  0.2 MG/DL


(0.2-1.0)


 


Aspartate Amino Transferase


(AST) 26 U/L (15-37)


 


 


Alanine Aminotransferase (ALT)


  20 U/L (12-78)


 


 


Alkaline Phosphatase


  80 U/L


()


 


Total Protein


  7.6 G/DL


(6.4-8.2)


 


Albumin


  3.9 G/DL


(3.4-5.0)


 


Globulin 3.7 g/dL  


 


Albumin/Globulin Ratio 1.1 (1.0-2.7)  


 


Lipase


  270 U/L


()








CT/MRI/US Diagnostic Results


CT/MRI/US Diagnostic Results :  


   Imaging Test Ordered:  CT abd/pelvis, RUQ US


   Impression


No acute findings.  Mild diverticulosis of the colon.  See official report in 

electronic medical record.





RUQ US: Multiple gallstones in the GB.  Minimal wall thickening.  See official 

report in electronic medical record.





Last Vital Signs








  Date Time  Temp Pulse Resp B/P (MAP) Pulse Ox O2 Delivery O2 Flow Rate FiO2


 


9/12/19 14:22 98.2 72 18 112/74 100 Room Air  








Status:  improved


Disposition:  HOME, SELF-CARE


Condition:  Improved


Scripts


Simethicone (Simethicone) 125 Mg Capsule


125 MG PO TID, #60 CAP


   Prov: Nae Hansen DO         9/12/19 


Esomeprazole Magnesium (NEXIUM) 40 Mg Capsule.dr


40 MG ORAL TWICE A DAY for 8 Days, #16 CAP


   Prov: Nae Hansen DO         9/12/19











Nae Hansen DO Sep 12, 2019 15:43

## 2019-09-12 NOTE — NUR
ED Nurse Note:



PT WALKED IN TO ER TODAY FROM HOME. AOX4. PT SEEN YESTERDAY AT [facility] ER FOR UPPER 
ABDOMINAL PAIN RADIATING TO BACK. PT RETURNS TODAY WITH THE SAME COMPLAINT AND 
IS REQUESTING A CT SCAN.

## 2019-09-12 NOTE — DIAGNOSTIC IMAGING REPORT
Indication: Abdominal pain

 

Technique: Continuous helical transaxial imaging of the abdomen and pelvis was

obtained from the lung bases to the pubic symphysis during intravenous contrast

administration. Coronal 2-D reformats were also obtained. Study obtained in a Siemens

sensation 64 slice CT.  Automatic Exposure Control was utilized.

 

Total Dose length Product (DLP):  417.33 mGycm

 

CT Dose Index Volume (CTDIvol):   9.2 mGy

 

Comparison: None

 

Findings: There is mild posterior basal atelectasis demonstrated. Liver and spleen

are unremarkable. Gallstones are suspected. There is no adrenal mass. Kidneys enhance

normally. There is no hydronephrosis. Few diverticula noted in the colon. There is no

free fluid. Atrophic uterus is noted. There is no evidence of appendicitis. Pars

interarticularis defects demonstrated bilaterally at L5. There is minimal anterior

subluxation of the L5 vertebra relative to S1.

 

IMPRESSION:

 

No acute findings demonstrated.

 

Mild diverticulosis of the colon. No definite diverticulitis.

 

L5 spondylolysis. Grade 1 spondylolisthesis L5 on S1.

 

 

 

The CT scanner at [facility] is accredited by the American College of

Radiology and the scans are performed using dose optimization techniques as

appropriate to a performed exam including Automatic Exposure control.

## 2019-09-12 NOTE — DIAGNOSTIC IMAGING REPORT
Indication: Abdominal pain

 

Comparison:  None

 

Single view of the abdomen obtained 

 

Findings:

   

Bowel gas pattern is nonspecific. Some stool noted in the colon. No mass, ectopic

calcifications, or abnormal gas collections are identified. The bones are osteopenic.

 

Impression: No acute findings

## 2019-09-13 NOTE — DIAGNOSTIC IMAGING REPORT
Indication: Abdominal pain

 

Technique: Gray-scale and duplex images of the upper abdomen were obtained

 

Comparison: No prior sonograms. Reference made to abdomen pelvis CT 9/12/2019

 

Findings: Gallbladder demonstrated gallstones. The gallbladder wall is borderline

thickened, measuring 4 mm in thickness. Gallbladder is incompletely distended,

however. No pericholecystic fluid. Sonographic Stevenson's sign is negative.  Common

bile duct measures 3 mm in diameter.  No intrahepatic biliary ductal dilatation. 

Liver demonstrates normal echogenicity, no focal abnormality.   Portal vein and

hepatic veins are patent.   Pancreas is unremarkable.    Spleen is unremarkable. 

Left kidney measures 10.3 cm in length.  Right kidney measures 11 cm length.  Both

kidneys demonstrate normal echogenicity.  There is no hydronephrosis.   No focal

abnormality . Non-aneurysmal abdominal aorta .

 

Impression: Cholelithiasis. Mild gallbladder wall thickening raises the possibility

of acute cholecystitis. Correlate with clinical findings, consider hepatobiliary

nuclear scan if there is high clinical suspicion

 

Negative for dilated bile ducts

 

No acute or significant abnormality otherwise

 

This agrees with the preliminary interpretation provided overnight by Dr. Loera

## 2019-09-16 ENCOUNTER — HOSPITAL ENCOUNTER (EMERGENCY)
Dept: HOSPITAL 72 - EMR | Age: 59
Discharge: HOME | End: 2019-09-16
Payer: COMMERCIAL

## 2019-09-16 VITALS — SYSTOLIC BLOOD PRESSURE: 146 MMHG | DIASTOLIC BLOOD PRESSURE: 92 MMHG

## 2019-09-16 VITALS — DIASTOLIC BLOOD PRESSURE: 86 MMHG | SYSTOLIC BLOOD PRESSURE: 138 MMHG

## 2019-09-16 VITALS — HEIGHT: 59 IN | WEIGHT: 102 LBS | BODY MASS INDEX: 20.56 KG/M2

## 2019-09-16 VITALS — SYSTOLIC BLOOD PRESSURE: 138 MMHG | DIASTOLIC BLOOD PRESSURE: 86 MMHG

## 2019-09-16 DIAGNOSIS — F41.9: ICD-10-CM

## 2019-09-16 DIAGNOSIS — R06.00: ICD-10-CM

## 2019-09-16 DIAGNOSIS — R10.9: Primary | ICD-10-CM

## 2019-09-16 DIAGNOSIS — R42: ICD-10-CM

## 2019-09-16 LAB
ADD MANUAL DIFF: NO
ALBUMIN SERPL-MCNC: 4.1 G/DL (ref 3.4–5)
ALBUMIN/GLOB SERPL: 1.2 {RATIO} (ref 1–2.7)
ALP SERPL-CCNC: 81 U/L (ref 46–116)
ALT SERPL-CCNC: 19 U/L (ref 12–78)
ANION GAP SERPL CALC-SCNC: 10 MMOL/L (ref 5–15)
APTT BLD: 26 SEC (ref 23–33)
AST SERPL-CCNC: 21 U/L (ref 15–37)
BASOPHILS NFR BLD AUTO: 1.5 % (ref 0–2)
BILIRUB SERPL-MCNC: 0.3 MG/DL (ref 0.2–1)
BUN SERPL-MCNC: 14 MG/DL (ref 7–18)
CALCIUM SERPL-MCNC: 9.4 MG/DL (ref 8.5–10.1)
CHLORIDE SERPL-SCNC: 104 MMOL/L (ref 98–107)
CO2 SERPL-SCNC: 26 MMOL/L (ref 21–32)
CREAT SERPL-MCNC: 1 MG/DL (ref 0.55–1.3)
EOSINOPHIL NFR BLD AUTO: 0.7 % (ref 0–3)
ERYTHROCYTE [DISTWIDTH] IN BLOOD BY AUTOMATED COUNT: 11 % (ref 11.6–14.8)
GLOBULIN SER-MCNC: 3.5 G/DL
HCT VFR BLD CALC: 40.8 % (ref 37–47)
HGB BLD-MCNC: 13.8 G/DL (ref 12–16)
INR PPP: 1 (ref 0.9–1.1)
LYMPHOCYTES NFR BLD AUTO: 37.8 % (ref 20–45)
MCV RBC AUTO: 91 FL (ref 80–99)
MONOCYTES NFR BLD AUTO: 8.9 % (ref 1–10)
NEUTROPHILS NFR BLD AUTO: 51.1 % (ref 45–75)
PLATELET # BLD: 335 K/UL (ref 150–450)
POTASSIUM SERPL-SCNC: 4.1 MMOL/L (ref 3.5–5.1)
RBC # BLD AUTO: 4.46 M/UL (ref 4.2–5.4)
SODIUM SERPL-SCNC: 140 MMOL/L (ref 136–145)
WBC # BLD AUTO: 7.3 K/UL (ref 4.8–10.8)

## 2019-09-16 PROCEDURE — 99284 EMERGENCY DEPT VISIT MOD MDM: CPT

## 2019-09-16 PROCEDURE — 71045 X-RAY EXAM CHEST 1 VIEW: CPT

## 2019-09-16 PROCEDURE — 85025 COMPLETE CBC W/AUTO DIFF WBC: CPT

## 2019-09-16 PROCEDURE — 76700 US EXAM ABDOM COMPLETE: CPT

## 2019-09-16 PROCEDURE — 93005 ELECTROCARDIOGRAM TRACING: CPT

## 2019-09-16 PROCEDURE — 80053 COMPREHEN METABOLIC PANEL: CPT

## 2019-09-16 PROCEDURE — 85730 THROMBOPLASTIN TIME PARTIAL: CPT

## 2019-09-16 PROCEDURE — 85610 PROTHROMBIN TIME: CPT

## 2019-09-16 PROCEDURE — 96360 HYDRATION IV INFUSION INIT: CPT

## 2019-09-16 PROCEDURE — 70450 CT HEAD/BRAIN W/O DYE: CPT

## 2019-09-16 PROCEDURE — 36415 COLL VENOUS BLD VENIPUNCTURE: CPT

## 2019-09-16 NOTE — NUR
ED Nurse Note:



DAUGHTER ADDS THAT FATHER HAD A FALL YESTERDAY. PT DENIES HEAD TRAUMA OR LOC. 
PT DENIES HEAD PAIN.

## 2019-09-16 NOTE — NUR
ED Nurse Note:



PT WALKED IN TO ER TODAY FROM HOME. AOX4. PT C/O DIFFICULTY SPEAKING AND 
TINGLING SENSATION OF LEFT SIDE OF BODY AND FACE X AROUND 1300 TODAY. NIHSS 
SCORE: 0. PT DENIES CHEST PAIN OR SOB.

## 2019-09-16 NOTE — EMERGENCY ROOM REPORT
History of Present Illness


General


Chief Complaint:  Stroke Symptoms


Source:  Patient





Present Illness


HPI


Patient is a 59-year-old female who presented after increased numbness to her 

arms and lips.  Patient had recently been seen in the hospital after having 

increased abdominal discomfort.  She had been prescribed acid blockers.  She 

reports having increased numbness to her arm approximately 1 to 2 hours ago.  

She denies any severe headache.  She had recently been prescribed medications 

for symptom medic treatment of abdominal discomfort.  She denies any chest pain 

at this time.  She denies being diabetic.  She speaks primarily Vincentian. She 

reports having some numbness to both arms.  She denies any difficulty with 

strength.  She had recently been having increased abdominal discomfort.  This 

is described as a burning sensation.


Allergies:  


Coded Allergies:  


     No Known Allergies (Unverified , 6/5/13)





Patient History


Past Medical History:  see triage record


Last Menstrual Period:  na


Reviewed Nursing Documentation:  PMH: Agreed; PSxH: Agreed





Nursing Documentation-PMH


Past Medical History:  No History, Except For


Hx Gastrointestinal Problems:  Yes - Gall stones





Review of Systems


All Other Systems:  negative except mentioned in HPI





Physical Exam





Vital Signs








  Date Time  Temp Pulse Resp B/P (MAP) Pulse Ox O2 Delivery O2 Flow Rate FiO2


 


9/16/19 16:33 98.2 97 20 141/98 (112) 99 Room Air  








Sp02 EP Interpretation:  reviewed, normal


General Appearance:  normal inspection, well appearing, no apparent distress, 

alert, GCS 15


Head:  atraumatic


ENT:  normal ENT inspection, hearing grossly normal, normal voice


Neck:  normal inspection, full range of motion, supple, no bony tend


Respiratory:  normal inspection, lungs clear, normal breath sounds, no 

respiratory distress, no retraction, no wheezing


Cardiovascular #1:  regular rate, rhythm, no edema


Gastrointestinal:  normal inspection, normal bowel sounds, non tender, soft, no 

guarding, no hernia


Genitourinary:  no CVA tenderness


Musculoskeletal:  normal inspection, back normal, normal range of motion


Neurologic:  normal inspection, alert, oriented x3, responsive, CNs III-XII nml 

as tested, motor strength/tone normal, DTRs symmetric, speech normal, no 

pronator


Psychiatric:  normal inspection, judgement/insight normal, mood/affect normal


Skin:  no rash





Medical Decision Making


Diagnostic Impression:  


 Primary Impression:  


 Abdominal pain


 Additional Impression:  


 Anxiety


ER Course


Patient presented for abdominal pain. Differential diagnoses included ischemic 

bowel, appendicitis, perforated viscus, abdominal aortic aneurysm, inferior 

myocardial infarction, viral gastroenteritis among others.Because patient's 

complexity imaging studies,  and laboratory testing ordered.


Laboratory testing showed .  Electrolytes were unremarkable.


Lipase was was normal


White blood count was was normal.





EKG interpreted by me showed normal sinus rhythm without acute ST or T wave 

changes. CT of the abdomen pelvis had been performed on prior visit.  


Abdominal ultrasound showed cholelithiasis without evident cholecystitis. 


Patient was noted to have no focal neurologic abnormalities evident on exam.  

She was offered admission for further work-up which she declined.  Patient does 

not appear to have any evidence of acute CVA on CT imaging.  Patient was 

advised to discontinue taking aluminum containing antacids.


Patient states that she will follow-up with her doctor.


Patient advised to return if she had any worsening condition or other concerns.





Labs








Test


  9/16/19


16:55


 


White Blood Count


  7.3 K/UL


(4.8-10.8)


 


Red Blood Count


  4.46 M/UL


(4.20-5.40)


 


Hemoglobin


  13.8 G/DL


(12.0-16.0)


 


Hematocrit


  40.8 %


(37.0-47.0)


 


Mean Corpuscular Volume 91 FL (80-99) 


 


Mean Corpuscular Hemoglobin


  31.0 PG


(27.0-31.0)


 


Mean Corpuscular Hemoglobin


Concent 34.0 G/DL


(32.0-36.0)


 


Red Cell Distribution Width


  11.0 %


(11.6-14.8)


 


Platelet Count


  335 K/UL


(150-450)


 


Mean Platelet Volume


  6.4 FL


(6.5-10.1)


 


Neutrophils (%) (Auto)


  51.1 %


(45.0-75.0)


 


Lymphocytes (%) (Auto)


  37.8 %


(20.0-45.0)


 


Monocytes (%) (Auto)


  8.9 %


(1.0-10.0)


 


Eosinophils (%) (Auto)


  0.7 %


(0.0-3.0)


 


Basophils (%) (Auto)


  1.5 %


(0.0-2.0)


 


Prothrombin Time


  10.2 SEC


(9.30-11.50)


 


Prothromb Time International


Ratio 1.0 (0.9-1.1) 


 


 


Activated Partial


Thromboplast Time 26 SEC (23-33) 


 


 


Sodium Level


  140 MMOL/L


(136-145)


 


Potassium Level


  4.1 MMOL/L


(3.5-5.1)


 


Chloride Level


  104 MMOL/L


()


 


Carbon Dioxide Level


  26 MMOL/L


(21-32)


 


Anion Gap


  10 mmol/L


(5-15)


 


Blood Urea Nitrogen


  14 mg/dL


(7-18)


 


Creatinine


  1.0 MG/DL


(0.55-1.30)


 


Estimat Glomerular Filtration


Rate 56.5 mL/min


(>60)


 


Glucose Level


  99 MG/DL


()


 


Calcium Level


  9.4 MG/DL


(8.5-10.1)


 


Total Bilirubin


  0.3 MG/DL


(0.2-1.0)


 


Aspartate Amino Transf


(AST/SGOT) 21 U/L (15-37) 


 


 


Alanine Aminotransferase


(ALT/SGPT) 19 U/L (12-78) 


 


 


Alkaline Phosphatase


  81 U/L


()


 


Total Protein


  7.6 G/DL


(6.4-8.2)


 


Albumin


  4.1 G/DL


(3.4-5.0)


 


Globulin 3.5 g/dL 


 


Albumin/Globulin Ratio 1.2 (1.0-2.7) 








EKG Diagnostic Results


Rate:  normal


Rhythm:  NSR


ST Segments:  no acute changes





Last Vital Signs








  Date Time  Temp Pulse Resp B/P (MAP) Pulse Ox O2 Delivery O2 Flow Rate FiO2


 


9/16/19 16:33 98.2 97 20 141/98 (112) 99 Room Air  








Status:  improved


Disposition:  HOME, SELF-CARE


Condition:  Stable


Scripts


Omeprazole (OMEPRAZOLE) 20 Mg Capsule.


20 MG ORAL DAILY, #30 CAP


   Prov: Mike Ruth MD         9/16/19











Mike Ruth MD Sep 16, 2019 16:51

## 2019-09-16 NOTE — DIAGNOSTIC IMAGING REPORT
Indication: Dizziness

 

Technique: Contiguous 5 mm thick transaxial imaging of the head obtained in a Siemens

Sensation 64 slice CT scanner.  Soft tissue and bone windows generated.  Automatic

Exposure Control was utilized.

 

 

Total Dose length Product (DLP):  1330 mGycm

 

CT Dose Index Volume (CTDIvol):   70.38, 0.15 mGy

 

Comparison: none

 

Findings: The size and configuration of the cortical sulci, basal cisterns, and

ventricles are within normal limits for age. There is no mass effect, midline shift,

or edema identified. There is no evidence of acute hemorrhage or abnormal intra-axial

or extra-axial fluid collections. The bones and soft tissues are unremarkable.

 

Impression: No mass effect, edema or acute bleed.

 

[facility] has communicated the preliminary results to the Emergency

Department.  Their findings are largely concordant with this report.

 

 

 

The CT scanner at [facility] is accredited by the American College of

Radiology and the scans are performed using dose optimization techniques as

appropriate to a performed exam including Automatic Exposure control.

## 2019-09-16 NOTE — DIAGNOSTIC IMAGING REPORT
Indication:  Abdominal pain

 

Technique: Grayscale and duplex Doppler imaging of the abdomen performed.

 

Comparison: None

 

Findings:

 

The liver is unremarkable. Doppler interrogation of the main portal vein shows

patency with hepatopedal, monophasic flow. There is no biliary ductal dilatation

identified. Gallbladder is full of multiple stones and mild gallbladder wall

thickening is noted. Sonographic Stevenson's is negative.

 

There demonstrated part of the pancreas, aorta and IVC show no definite

abnormalities.

 

There is a probable nonobstructive stone within the left kidney seen is a echogenic

focus with shadowing. There is no hydronephrosis.

 

IMPRESSION:

 

Cholelithiasis with wall thickening. Cholecystitis not excluded. Correlate

clinically.

 

Possible nonobstructive stone in the left kidney.

## 2019-09-17 NOTE — CARDIOLOGY REPORT
--------------- APPROVED REPORT --------------





EKG Measurement

Heart [id number]

MS [id number]

[id number]

[id number]

[id number]





Normal sinus rhythm

Normal ECG

## 2019-09-17 NOTE — DIAGNOSTIC IMAGING REPORT
Indication: Dyspnea

 

Comparison:  9/11/2019

 

A single view chest radiograph was obtained.

 

Findings:

 

Cardiomediastinal appearance is within normal limits for age. The lungs are clear.

Pulmonary vascularity is appropriate. The diaphragmatic contour is smooth and

costophrenic angles are sharp. No pleural effusions are identified. The bones are

osteopenic.

 

Impression: No acute findings

## 2019-10-01 ENCOUNTER — HOSPITAL ENCOUNTER (EMERGENCY)
Dept: HOSPITAL 72 - EMR | Age: 59
Discharge: HOME | End: 2019-10-01
Payer: COMMERCIAL

## 2019-10-01 VITALS — WEIGHT: 96 LBS | HEIGHT: 60 IN | BODY MASS INDEX: 18.85 KG/M2

## 2019-10-01 VITALS — DIASTOLIC BLOOD PRESSURE: 99 MMHG | SYSTOLIC BLOOD PRESSURE: 145 MMHG

## 2019-10-01 VITALS — DIASTOLIC BLOOD PRESSURE: 89 MMHG | SYSTOLIC BLOOD PRESSURE: 157 MMHG

## 2019-10-01 VITALS — SYSTOLIC BLOOD PRESSURE: 112 MMHG | DIASTOLIC BLOOD PRESSURE: 67 MMHG

## 2019-10-01 DIAGNOSIS — F41.9: ICD-10-CM

## 2019-10-01 DIAGNOSIS — R00.2: ICD-10-CM

## 2019-10-01 DIAGNOSIS — R07.9: Primary | ICD-10-CM

## 2019-10-01 DIAGNOSIS — K21.9: ICD-10-CM

## 2019-10-01 DIAGNOSIS — R10.9: ICD-10-CM

## 2019-10-01 LAB
ADD MANUAL DIFF: NO
ALBUMIN SERPL-MCNC: 3.7 G/DL (ref 3.4–5)
ALBUMIN/GLOB SERPL: 1.1 {RATIO} (ref 1–2.7)
ALP SERPL-CCNC: 69 U/L (ref 46–116)
ALT SERPL-CCNC: 23 U/L (ref 12–78)
ANION GAP SERPL CALC-SCNC: 7 MMOL/L (ref 5–15)
AST SERPL-CCNC: 21 U/L (ref 15–37)
BASOPHILS NFR BLD AUTO: 1 % (ref 0–2)
BILIRUB SERPL-MCNC: 0.3 MG/DL (ref 0.2–1)
BUN SERPL-MCNC: 12 MG/DL (ref 7–18)
CALCIUM SERPL-MCNC: 9.4 MG/DL (ref 8.5–10.1)
CHLORIDE SERPL-SCNC: 109 MMOL/L (ref 98–107)
CK MB SERPL-MCNC: < 0.5 NG/ML (ref 0–3.6)
CK SERPL-CCNC: 36 U/L (ref 26–308)
CO2 SERPL-SCNC: 27 MMOL/L (ref 21–32)
CREAT SERPL-MCNC: 0.8 MG/DL (ref 0.55–1.3)
EOSINOPHIL NFR BLD AUTO: 0.4 % (ref 0–3)
ERYTHROCYTE [DISTWIDTH] IN BLOOD BY AUTOMATED COUNT: 11.5 % (ref 11.6–14.8)
GLOBULIN SER-MCNC: 3.3 G/DL
HCT VFR BLD CALC: 36.7 % (ref 37–47)
HGB BLD-MCNC: 12.3 G/DL (ref 12–16)
LYMPHOCYTES NFR BLD AUTO: 20.6 % (ref 20–45)
MCV RBC AUTO: 91 FL (ref 80–99)
MONOCYTES NFR BLD AUTO: 6.6 % (ref 1–10)
NEUTROPHILS NFR BLD AUTO: 71.4 % (ref 45–75)
PLATELET # BLD: 322 K/UL (ref 150–450)
POTASSIUM SERPL-SCNC: 3.6 MMOL/L (ref 3.5–5.1)
RBC # BLD AUTO: 4.04 M/UL (ref 4.2–5.4)
SODIUM SERPL-SCNC: 143 MMOL/L (ref 136–145)
WBC # BLD AUTO: 6.7 K/UL (ref 4.8–10.8)

## 2019-10-01 PROCEDURE — 82553 CREATINE MB FRACTION: CPT

## 2019-10-01 PROCEDURE — 99284 EMERGENCY DEPT VISIT MOD MDM: CPT

## 2019-10-01 PROCEDURE — 96374 THER/PROPH/DIAG INJ IV PUSH: CPT

## 2019-10-01 PROCEDURE — 84484 ASSAY OF TROPONIN QUANT: CPT

## 2019-10-01 PROCEDURE — 82550 ASSAY OF CK (CPK): CPT

## 2019-10-01 PROCEDURE — 85025 COMPLETE CBC W/AUTO DIFF WBC: CPT

## 2019-10-01 PROCEDURE — 80053 COMPREHEN METABOLIC PANEL: CPT

## 2019-10-01 PROCEDURE — 93005 ELECTROCARDIOGRAM TRACING: CPT

## 2019-10-01 PROCEDURE — 71045 X-RAY EXAM CHEST 1 VIEW: CPT

## 2019-10-01 PROCEDURE — 83690 ASSAY OF LIPASE: CPT

## 2019-10-01 PROCEDURE — 36415 COLL VENOUS BLD VENIPUNCTURE: CPT

## 2019-10-01 NOTE — NUR
ER DISCHARGE NOTE:

Patient is cleared to be discharged per ERMD, pt is aox4, on room air, with 
stable vital signs. pt was given dc and prescription instructions, pt was able 
to verbalize understanding, pt id band and iv site removed without 
complications. pt is able to ambulate with steady gait. pt took all 
belongings.pt relates she is going to her pmd now for appt.

## 2019-10-01 NOTE — EMERGENCY ROOM REPORT
History of Present Illness


General


Chief Complaint:  Chest Pain


Source:  Patient





Present Illness


HPI


Disclaimer: Please note that this report is being documented using DRAGON 

technology. This can lead to erroneous entry secondary to incorrect 

interpretation by the dictating instrument.





HPI: 59-year-old female with a history of cholelithiasis and GERD presents for 

evaluation of chest pain shortness of breath.  She awoke suddenly at 5 AM 

complaining of a lump in her throat, tachycardia with palpitations, 

hyperventilation, perioral numbness, tongue numbness, and abnormal taste in her 

mouth, some left-sided chest pressure and tightness when breathing as well as 

some tingling in the left upper extremity.  The symptoms gradually resolved and 

then returned early this morning.  She is currently not experiencing any of 

these symptoms.  She has had similar presentations in this emergency department 

and has had thorough work-ups for both her symptomatic gastritis, 

cholelithiasis and was evaluated for stroke on her last visit.  She was 

scheduled to follow-up with her PMD today to discuss her anxiety symptoms.  She 

does not believe she has anxiety because she has no recent stresses and cannot 

think of a reason that she would have anxiety.  She also notes some abdominal 

discomfort, bloated sensation and pain in the xiphoid process that radiates to 

the left side of the chest.  She had the symptoms on prior examination as well.

  She is feeling improved and took no medication prior to arrival.


 


PMH: GERD, cholelithiasis


 


PSH: None


 


Allergies: None


 


Social Hx: Denies drug or alcohol abuse


Allergies:  


Coded Allergies:  


     No Known Allergies (Unverified , 6/5/13)





Patient History


Last Menstrual Period:  15 YEARS AGO


Pregnant Now:  No





Nursing Documentation-PMH


Past Medical History:  No History, Except For


Hx Gastrointestinal Problems:  Yes - Gallstones





Review of Systems


All Other Systems:  negative except mentioned in HPI





Physical Exam





Vital Signs








  Date Time  Temp Pulse Resp B/P (MAP) Pulse Ox O2 Delivery O2 Flow Rate FiO2


 


10/1/19 09:53 98.1 88 16 145/99 (114) 100 Room Air  





 





General: Awake and alert, no acute distress


HEENT: NC/AT. EOMI. PERRLA.  Moist mucous membranes


Neck: Supple, trachea midline


Chest Wall: Tenderness to palpation particularly the xiphoid process.  No 

deformity, no crepitus


Cardiovascular: RRR.  S1 and S2 normal.  No murmur appreciated


Resp: Normal work of breathing. No cough, wheezing or crackles appreciated


Abdomen: Abdomen is soft, nondistended.  Nontender.  No masses.  No rebound


Skin: Intact.  No abrasions, laceration or rash over the exposed skin


MSK: Normal tone and bulk. Moving all extremities.  No obvious deformity.


Neuro: Awake and alert.  Mentating appropriately.





Medical Decision Making


Diagnostic Impression:  


 Primary Impression:  


 Palpitations


 Additional Impressions:  


 Anxiety


 GERD (gastroesophageal reflux disease)


 Abdominal pain


 Chest pain


ER Course


Is a 59-year-old female presenting for evaluation of shortness of breath, chest 

pain, palpitations, numbness and tingling occurring this morning.  Symptoms are 

now resolved.  Differential includes was not limited to angina, ACS, PE, 

pneumonia, pneumothorax, aortic aneurysm, anxiety, GERD, musculoskeletal chest 

wall pain.  We will start a broad cardiac work-up including EKG, blood work 

with cardiac enzymes, chest x-ray.  We will give the patient aspirin and a 

small dose of Ativan as her symptoms are consistent with anxiety.  Physical 

exam and vital signs are reassuring.  Will monitor in the emergency department.

  Disposition depending on labs and imaging results as well as patient 

improvement.





Labs








Test


  10/1/19


10:38


 


White Blood Count


  6.7 K/UL


(4.8-10.8)


 


Red Blood Count


  4.04 M/UL


(4.20-5.40)


 


Hemoglobin


  12.3 G/DL


(12.0-16.0)


 


Hematocrit


  36.7 %


(37.0-47.0)


 


Mean Corpuscular Volume 91 FL (80-99) 


 


Mean Corpuscular Hemoglobin


  30.4 PG


(27.0-31.0)


 


Mean Corpuscular Hemoglobin


Concent 33.5 G/DL


(32.0-36.0)


 


Red Cell Distribution Width


  11.5 %


(11.6-14.8)


 


Platelet Count


  322 K/UL


(150-450)


 


Mean Platelet Volume


  6.1 FL


(6.5-10.1)


 


Neutrophils (%) (Auto)


  71.4 %


(45.0-75.0)


 


Lymphocytes (%) (Auto)


  20.6 %


(20.0-45.0)


 


Monocytes (%) (Auto)


  6.6 %


(1.0-10.0)


 


Eosinophils (%) (Auto)


  0.4 %


(0.0-3.0)


 


Basophils (%) (Auto)


  1.0 %


(0.0-2.0)


 


Sodium Level


  143 MMOL/L


(136-145)


 


Potassium Level


  3.6 MMOL/L


(3.5-5.1)


 


Chloride Level


  109 MMOL/L


()


 


Carbon Dioxide Level


  27 MMOL/L


(21-32)


 


Anion Gap


  7 mmol/L


(5-15)


 


Blood Urea Nitrogen


  12 mg/dL


(7-18)


 


Creatinine


  0.8 MG/DL


(0.55-1.30)


 


Estimat Glomerular Filtration


Rate > 60 mL/min


(>60)


 


Glucose Level


  107 MG/DL


()


 


Calcium Level


  9.4 MG/DL


(8.5-10.1)


 


Total Bilirubin


  0.3 MG/DL


(0.2-1.0)


 


Aspartate Amino Transf


(AST/SGOT) 21 U/L (15-37) 


 


 


Alanine Aminotransferase


(ALT/SGPT) 23 U/L (12-78) 


 


 


Alkaline Phosphatase


  69 U/L


()


 


Total Creatine Kinase


  36 U/L


()


 


Creatine Kinase MB


  < 0.5 NG/ML


(0.0-3.6)


 


Creatine Kinase MB Relative


Index 1.3 


 


 


Troponin I


  0.000 ng/mL


(0.000-0.056)


 


Total Protein


  7.0 G/DL


(6.4-8.2)


 


Albumin


  3.7 G/DL


(3.4-5.0)


 


Globulin 3.3 g/dL 


 


Albumin/Globulin Ratio 1.1 (1.0-2.7) 


 


Lipase


  315 U/L


()








EKG Diagnostic Results


EKG Time:  10:15


Rate:  normal


Rhythm:  NSR


ST Segments:  no acute changes


Other Impression


Sinus rhythm, normal axis, normal intervals, no ST segment changes.





Rhythm Strip Diag. Results


Rhythm Strip Time:  10:15


EP Interpretation:  yes


Rate:  80s


Rhythm:  NSR, no PVC's, no ectopy





Chest X-Ray Diagnostic Results


Chest X-Ray Diagnostic Results :  


   Chest X-Ray Ordered:  Yes


   # of Views/Limited/Complete:  1 View


   Indication:  Chest Pain


   EP Interpretation:  Yes


   Interpretation:  no consolidation, no effusion, no pneumothorax


   Impression:  No acute disease


   Electronically Signed by:  Electronically signed by Dr. Aram Villegas


Reevaluation Time:  13:29





Last Vital Signs








  Date Time  Temp Pulse Resp B/P (MAP) Pulse Ox O2 Delivery O2 Flow Rate FiO2


 


10/1/19 09:53 98.1 88 16 145/99 (114) 100 Room Air  








Reevaluation Impression


Patient noted moderate improvement after receiving anxiolytics and labs, EKG 

and chest x-ray are again virtually unremarkable.  While speaking with me at 

bedside regarding her multiple work-ups for similar complaints she does seem to 

admit that admits that she has a history of anxiety stating that she saw a 

psychiatrist sometime ago but was not started on any medication.  She does not 

know why she would be having "panic attacks" now and cannot recall any new life 

stressors or obvious triggers.  She is also concerned that she needs an EGD 

though denies any recent hematemesis or previous GI bleeds.  She does note that 

she had a history of feeling back to pylori several years ago which was treated 

successfully with medications and denies any heartburn symptoms.  The way she 

is describing waking up in the middle of the night with shortness of breath 

palpitations and a chest tightness may represent GERD triggering anxiety attack 

and should be worked up further however at this time I do not believe she 

requires admission and can be safely followed up as an outpatient.  As it 

happens, the patient has an appointment with her PMD in 1.5 hours and is now 

requesting to be discharged so that she can make that appointment.  I strongly 

encouraged her to discuss with her PMD these multiple emergency department 

visits and to set up outpatient evaluations by a cardiologist and a 

gastroenterologist regarding possible stress test to rule out cardiac causes as 

well as an EGD to evaluate for esophagitis and gastritis/GERD.  She states she 

had a colonoscopy recently that was unremarkable.  Vital signs are stable, I 

believe she is safe and appropriate for outpatient follow-up.  She will be 

discharged to follow-up with her PMD immediately after discharge from the ED 

today.  We discussed reasons to return to the emergency department.  She 

understands and agrees with her treatment plan.  Her family members are also 

updated at bedside.


Disposition:  HOME, SELF-CARE


Condition:  Stable











Aram Villegas MD Oct 1, 2019 10:22

## 2019-10-04 NOTE — CARDIOLOGY REPORT
--------------- APPROVED REPORT --------------





EKG Measurement

Heart [id number]

AL [id number]

[id number]

[id number]

[id number]





Normal sinus rhythm

Normal ECG

## 2019-12-08 ENCOUNTER — HOSPITAL ENCOUNTER (EMERGENCY)
Dept: HOSPITAL 72 - EMR | Age: 59
Discharge: HOME | End: 2019-12-08
Payer: COMMERCIAL

## 2019-12-08 VITALS — SYSTOLIC BLOOD PRESSURE: 142 MMHG | DIASTOLIC BLOOD PRESSURE: 95 MMHG

## 2019-12-08 VITALS — SYSTOLIC BLOOD PRESSURE: 145 MMHG | DIASTOLIC BLOOD PRESSURE: 92 MMHG

## 2019-12-08 VITALS — HEIGHT: 59 IN | WEIGHT: 98 LBS | BODY MASS INDEX: 19.76 KG/M2

## 2019-12-08 DIAGNOSIS — R30.0: ICD-10-CM

## 2019-12-08 DIAGNOSIS — R10.2: Primary | ICD-10-CM

## 2019-12-08 DIAGNOSIS — N93.9: ICD-10-CM

## 2019-12-08 LAB
APPEARANCE UR: (no result)
APTT PPP: (no result) S
GLUCOSE UR STRIP-MCNC: NEGATIVE MG/DL
KETONES UR QL STRIP: (no result)
LEUKOCYTE ESTERASE UR QL STRIP: (no result)
NITRITE UR QL STRIP: NEGATIVE
PH UR STRIP: 5 [PH] (ref 4.5–8)
PROT UR QL STRIP: (no result)
SP GR UR STRIP: 1.03 (ref 1–1.03)
UROBILINOGEN UR-MCNC: 1 MG/DL (ref 0–1)

## 2019-12-08 PROCEDURE — 76856 US EXAM PELVIC COMPLETE: CPT

## 2019-12-08 PROCEDURE — 99284 EMERGENCY DEPT VISIT MOD MDM: CPT

## 2019-12-08 PROCEDURE — 76830 TRANSVAGINAL US NON-OB: CPT

## 2019-12-08 PROCEDURE — 87086 URINE CULTURE/COLONY COUNT: CPT

## 2019-12-08 PROCEDURE — 81003 URINALYSIS AUTO W/O SCOPE: CPT

## 2019-12-08 NOTE — EMERGENCY ROOM REPORT
History of Present Illness


General


Chief Complaint:  Female Urogenital Problems


Source:  Medical Record





Present Illness


HPI


Patient is a 59-year-old female presents after increased dysuria and urinary 

frequency.  Patient reports having increased vaginal spotting.  She states she 

is postmenopausal for many years.  She reports having previous ultrasound 

approximately 1 year ago.  She denies any fever or vomiting.Patient reports 

having unprotected intercourse approximately 1 month ago.  She has been having 

increased vaginal itching as well as increased burning sensation.  She reports 

using some cream earlier in the day prior to bleeding beginning


Allergies:  


Coded Allergies:  


     No Known Allergies (Unverified , 6/5/13)





Patient History


Past Medical History:  see triage record


Reviewed Nursing Documentation:  PMH: Agreed; PSxH: Agreed





Nursing Documentation-PMH


Past Medical History:  No History, Except For


Hx Gastrointestinal Problems:  Yes - GALLSTONE, ACID REFLUX





Review of Systems


All Other Systems:  negative except mentioned in HPI





Physical Exam





Vital Signs








  Date Time  Temp Pulse Resp B/P (MAP) Pulse Ox O2 Delivery O2 Flow Rate FiO2


 


12/8/19 20:03 98.2 95 18 137/89 (105) 98 Room Air  








General Appearance:  well appearing, no apparent distress, alert, GCS 15, non-

toxic


Head:  normocephalic, atraumatic


ENT:  hearing grossly normal, normal voice


Neck:  full range of motion, supple


Respiratory:  normal inspection, lungs clear, no respiratory distress, speaking 

full sentences


Cardiovascular #1:  normal inspection


Gastrointestinal:  normal inspection


Musculoskeletal:  normal inspection, no calf tenderness


Neurologic:  alert, motor strength/tone normal, CNs III-XII nml as tested, EOM 

palsy, normal gait


Psychiatric:  mood/affect normal


Skin:  no rash





Medical Decision Making


Diagnostic Impression:  


 Primary Impression:  


 Pelvic pain





Last Vital Signs








  Date Time  Temp Pulse Resp B/P (MAP) Pulse Ox O2 Delivery O2 Flow Rate FiO2


 


12/8/19 20:13 98.2 95 15 148/95 100 Room Air  








Referrals:  


NON PHYSICIAN (PCP)











Mike Ruth MD Dec 8, 2019 20:25

## 2019-12-08 NOTE — DIAGNOSTIC IMAGING REPORT
Indication: Pelvic pain

 

Technique: Transabdominal and transvaginal images of the pelvis. Doppler

interrogation of the ovaries

 

Comparison: none. Reference made to abdomen pelvis CT dated 9/12/2019

 

Findings: Uterus measures 5.6 cm in length by 1.8 cm AP. The endometrium measures 1

mm thick. Neither ovary is visualized. No gross adnexal mass. The myometrium

demonstrates an 11 mm fibroid. No free cul-de-sac fluid

 

Impression: Small uterine fibroid

 

No other significant abnormality. Note nonvisualization of the ovaries

## 2024-05-07 NOTE — NUR
ED Nurse Note:

pt arrives from home with family member for chest pressure and feeling of 
bloating.  pt relates woke up and feeling very shaky from nerves this am.  pt 
without follow up after last ed visit for same s/s.  md eval of pt and aware of 
ecg results. 76